# Patient Record
Sex: FEMALE | Race: BLACK OR AFRICAN AMERICAN
[De-identification: names, ages, dates, MRNs, and addresses within clinical notes are randomized per-mention and may not be internally consistent; named-entity substitution may affect disease eponyms.]

---

## 2018-02-17 ENCOUNTER — HOSPITAL ENCOUNTER (EMERGENCY)
Dept: HOSPITAL 72 - EMR | Age: 43
Discharge: LEFT BEFORE DISCHARGE | End: 2018-02-17
Payer: MEDICAID

## 2018-02-17 VITALS — WEIGHT: 270 LBS | BODY MASS INDEX: 49.69 KG/M2 | HEIGHT: 62 IN

## 2018-02-17 VITALS — DIASTOLIC BLOOD PRESSURE: 88 MMHG | SYSTOLIC BLOOD PRESSURE: 148 MMHG

## 2018-02-17 VITALS — SYSTOLIC BLOOD PRESSURE: 154 MMHG | DIASTOLIC BLOOD PRESSURE: 98 MMHG

## 2018-02-17 DIAGNOSIS — R60.9: ICD-10-CM

## 2018-02-17 PROCEDURE — 99281 EMR DPT VST MAYX REQ PHY/QHP: CPT

## 2018-02-17 NOTE — EMERGENCY ROOM REPORT
History of Present Illness


General


Chief Complaint:  Edema


Source:  Patient





Present Illness


HPI


This is a 42-year-old female who has a history of postpartum car myopathy in 

the past.  She has had a spontaneous vaginal delivery at [facility] a week ago.  

She had some fluid retention from her pregnancy.  Blood pressure was high also.

  She is currently taking labetalol and Lasix 20 mg every other day.  She was 

just released a week ago.  Said she had an echocardiogram of her heart and was 

told was fine and was discharged after 3 days in the hospital.  She went back 

to [facility] 2 days ago because of fluid retention.  They then placed on Lasix 20 

mg every other day.  Blood work was fine.  Chest x-ray was normal per patient.  

She came back because the Lasix is not working.  She still have fluid 

retention.  Worse when she walks.  No fever chills but no shortness of breath.  

Denies any other complaint.


Allergies:  


Coded Allergies:  


     No Known Allergies (Unverified , 7/5/15)





Patient History


Past Medical History:  see triage record, old chart reviewed, HTN


Past Surgical History:  other


Pertinent Family History:  none


Social History:  Denies: smoking


Last Menstrual Period:  Delivered baby 1 week ago


Pregnant Now:  No


Immunizations:  other


Reviewed Nursing Documentation:  PMH: Agreed, PSxH: Agreed





Nursing Documentation-PMH


Past Medical History:  No History, Except For


Hx Cardiac Problems:  Yes - CHF


Hx Hypertension:  Yes


Hx COPD:  No - Bronchitis


Hx Cancer:  No


Hx Gastrointestinal Problems:  No





Review of Systems


Eye:  Denies: eye pain, blurred vision


ENT:  Denies: ear pain, nose congestion, throat swelling


Respiratory:  Denies: cough, shortness of breath


Cardiovascular:  Denies: chest pain, palpitations


Gastrointestinal:  Denies: abdominal pain, diarrhea, nausea, vomiting


Musculoskeletal:  Reports: other - Edema, Denies: back pain, joint pain


Skin:  Denies: rash


Neurological:  Denies: headache, numbness


Endocrine:  Denies: increased thirst, increased urine


Hematologic/Lymphatic:  Denies: easy bruising


All Other Systems:  negative except mentioned in HPI





Physical Exam





Vital Signs








  Date Time  Temp Pulse Resp B/P (MAP) Pulse Ox O2 Delivery O2 Flow Rate FiO2


 


2/17/18 21:02 98.7 98 17 151/98 97 Room Air  





 98.8       





vitals with high blood pressure


Sp02 EP Interpretation:  reviewed, normal


General Appearance:  well appearing, no apparent distress, alert, obese


Head:  normocephalic, atraumatic


Eyes:  bilateral eye PERRL, bilateral eye EOMI


ENT:  hearing grossly normal, normal pharynx


Neck:  full range of motion, supple, no meningismus


Respiratory:  chest non-tender, lungs clear, normal breath sounds


Cardiovascular #1:  regular rate, rhythm, no murmur


Gastrointestinal:  normal bowel sounds, non tender, no mass, no organomegaly, 

no bruit, non-distended


Musculoskeletal:  back normal, gait/station normal, normal range of motion, 

swelling - 2+ edema


Psychiatric:  mood/affect normal


Skin:  warm/dry





Medical Decision Making


Diagnostic Impression:  


 Primary Impression:  


 Peripheral edema


ER Course


Patient presents with peripheral edema.  Clinically no evidence of CHF.  I will 

order blood work and IV Lasix.  Patient eloped without staff now.  We checked 

the waiting room and outside and there's nobody care.  No one in the bathroom.  

Patient again eloped.





Last Vital Signs








  Date Time  Temp Pulse Resp B/P (MAP) Pulse Ox O2 Delivery O2 Flow Rate FiO2


 


2/17/18 21:02 98.7 98 17 151/98 97 Room Air  





 98.8       








Status:  unchanged


Disposition:  ELOPED


Condition:  Stable











TANNER MORAN M.D. Feb 17, 2018 21:23

## 2018-02-19 ENCOUNTER — HOSPITAL ENCOUNTER (INPATIENT)
Dept: HOSPITAL 72 - EMR | Age: 43
LOS: 1 days | Discharge: HOME | DRG: 566 | End: 2018-02-20
Payer: MEDICAID

## 2018-02-19 VITALS — SYSTOLIC BLOOD PRESSURE: 147 MMHG | DIASTOLIC BLOOD PRESSURE: 104 MMHG

## 2018-02-19 VITALS — DIASTOLIC BLOOD PRESSURE: 99 MMHG | SYSTOLIC BLOOD PRESSURE: 137 MMHG

## 2018-02-19 VITALS — SYSTOLIC BLOOD PRESSURE: 108 MMHG | DIASTOLIC BLOOD PRESSURE: 71 MMHG

## 2018-02-19 VITALS — WEIGHT: 258.38 LBS | HEIGHT: 62 IN | BODY MASS INDEX: 47.55 KG/M2

## 2018-02-19 VITALS — SYSTOLIC BLOOD PRESSURE: 139 MMHG | DIASTOLIC BLOOD PRESSURE: 85 MMHG

## 2018-02-19 VITALS — DIASTOLIC BLOOD PRESSURE: 92 MMHG | SYSTOLIC BLOOD PRESSURE: 132 MMHG

## 2018-02-19 VITALS — DIASTOLIC BLOOD PRESSURE: 90 MMHG | SYSTOLIC BLOOD PRESSURE: 150 MMHG

## 2018-02-19 VITALS — DIASTOLIC BLOOD PRESSURE: 89 MMHG | SYSTOLIC BLOOD PRESSURE: 130 MMHG

## 2018-02-19 VITALS — DIASTOLIC BLOOD PRESSURE: 98 MMHG | SYSTOLIC BLOOD PRESSURE: 160 MMHG

## 2018-02-19 VITALS — SYSTOLIC BLOOD PRESSURE: 145 MMHG | DIASTOLIC BLOOD PRESSURE: 101 MMHG

## 2018-02-19 VITALS — DIASTOLIC BLOOD PRESSURE: 113 MMHG | SYSTOLIC BLOOD PRESSURE: 148 MMHG

## 2018-02-19 DIAGNOSIS — R31.9: ICD-10-CM

## 2018-02-19 DIAGNOSIS — O99.89: Primary | ICD-10-CM

## 2018-02-19 DIAGNOSIS — I50.21: ICD-10-CM

## 2018-02-19 DIAGNOSIS — J20.9: ICD-10-CM

## 2018-02-19 DIAGNOSIS — N39.0: ICD-10-CM

## 2018-02-19 DIAGNOSIS — E66.9: ICD-10-CM

## 2018-02-19 LAB
ADD MANUAL DIFF: NO
ALBUMIN SERPL-MCNC: 1.8 G/DL (ref 3.4–5)
ALBUMIN SERPL-MCNC: 1.9 G/DL (ref 3.4–5)
ALBUMIN/GLOB SERPL: 0.4 {RATIO} (ref 1–2.7)
ALP SERPL-CCNC: 104 U/L (ref 46–116)
ALP SERPL-CCNC: 113 U/L (ref 46–116)
ALT SERPL-CCNC: 117 U/L (ref 12–78)
ALT SERPL-CCNC: 125 U/L (ref 12–78)
ANION GAP SERPL CALC-SCNC: 6 MMOL/L (ref 5–15)
APPEARANCE UR: (no result)
APTT PPP: YELLOW S
AST SERPL-CCNC: 73 U/L (ref 15–37)
AST SERPL-CCNC: 86 U/L (ref 15–37)
BASOPHILS NFR BLD AUTO: 0.5 % (ref 0–2)
BILIRUB DIRECT SERPL-MCNC: < 0.1 MG/DL (ref 0–0.3)
BILIRUB SERPL-MCNC: 0.2 MG/DL (ref 0.2–1)
BILIRUB SERPL-MCNC: 0.2 MG/DL (ref 0.2–1)
BUN SERPL-MCNC: 17 MG/DL (ref 7–18)
CALCIUM SERPL-MCNC: 7.8 MG/DL (ref 8.5–10.1)
CHLORIDE SERPL-SCNC: 106 MMOL/L (ref 98–107)
CO2 SERPL-SCNC: 27 MMOL/L (ref 21–32)
CREAT SERPL-MCNC: 1 MG/DL (ref 0.55–1.3)
EOSINOPHIL NFR BLD AUTO: 0.6 % (ref 0–3)
ERYTHROCYTE [DISTWIDTH] IN BLOOD BY AUTOMATED COUNT: 16.1 % (ref 11.6–14.8)
GLOBULIN SER-MCNC: 4.4 G/DL
GLUCOSE UR STRIP-MCNC: NEGATIVE MG/DL
HCT VFR BLD CALC: 38.6 % (ref 37–47)
HGB BLD-MCNC: 12.2 G/DL (ref 12–16)
KETONES UR QL STRIP: NEGATIVE
LEUKOCYTE ESTERASE UR QL STRIP: (no result)
LYMPHOCYTES NFR BLD AUTO: 33.3 % (ref 20–45)
MCV RBC AUTO: 90 FL (ref 80–99)
MONOCYTES NFR BLD AUTO: 5.9 % (ref 1–10)
NEUTROPHILS NFR BLD AUTO: 59.8 % (ref 45–75)
NITRITE UR QL STRIP: POSITIVE
PH UR STRIP: 6.5 [PH] (ref 4.5–8)
PLATELET # BLD: 208 K/UL (ref 150–450)
POTASSIUM SERPL-SCNC: 3.5 MMOL/L (ref 3.5–5.1)
PROT UR QL STRIP: (no result)
RBC # BLD AUTO: 4.26 M/UL (ref 4.2–5.4)
SODIUM SERPL-SCNC: 139 MMOL/L (ref 136–145)
SP GR UR STRIP: 1.01 (ref 1–1.03)
UROBILINOGEN UR-MCNC: NORMAL MG/DL (ref 0–1)
WBC # BLD AUTO: 5.1 K/UL (ref 4.8–10.8)

## 2018-02-19 PROCEDURE — 80061 LIPID PANEL: CPT

## 2018-02-19 PROCEDURE — 93005 ELECTROCARDIOGRAM TRACING: CPT

## 2018-02-19 PROCEDURE — 80076 HEPATIC FUNCTION PANEL: CPT

## 2018-02-19 PROCEDURE — 84443 ASSAY THYROID STIM HORMONE: CPT

## 2018-02-19 PROCEDURE — 36415 COLL VENOUS BLD VENIPUNCTURE: CPT

## 2018-02-19 PROCEDURE — 81003 URINALYSIS AUTO W/O SCOPE: CPT

## 2018-02-19 PROCEDURE — 99285 EMERGENCY DEPT VISIT HI MDM: CPT

## 2018-02-19 PROCEDURE — 87181 SC STD AGAR DILUTION PER AGT: CPT

## 2018-02-19 PROCEDURE — 83690 ASSAY OF LIPASE: CPT

## 2018-02-19 PROCEDURE — 94640 AIRWAY INHALATION TREATMENT: CPT

## 2018-02-19 PROCEDURE — 83880 ASSAY OF NATRIURETIC PEPTIDE: CPT

## 2018-02-19 PROCEDURE — 85025 COMPLETE CBC W/AUTO DIFF WBC: CPT

## 2018-02-19 PROCEDURE — 80053 COMPREHEN METABOLIC PANEL: CPT

## 2018-02-19 PROCEDURE — 93306 TTE W/DOPPLER COMPLETE: CPT

## 2018-02-19 PROCEDURE — 87086 URINE CULTURE/COLONY COUNT: CPT

## 2018-02-19 PROCEDURE — 83735 ASSAY OF MAGNESIUM: CPT

## 2018-02-19 PROCEDURE — 83036 HEMOGLOBIN GLYCOSYLATED A1C: CPT

## 2018-02-19 PROCEDURE — 72193 CT PELVIS W/DYE: CPT

## 2018-02-19 PROCEDURE — 84484 ASSAY OF TROPONIN QUANT: CPT

## 2018-02-19 PROCEDURE — 87040 BLOOD CULTURE FOR BACTERIA: CPT

## 2018-02-19 RX ADMIN — SODIUM CHLORIDE SCH MLS/HR: 0.9 INJECTION INTRAVENOUS at 11:35

## 2018-02-19 RX ADMIN — LABETALOL HCL SCH MG: 200 TABLET, FILM COATED ORAL at 13:10

## 2018-02-19 RX ADMIN — LABETALOL HCL SCH MG: 200 TABLET, FILM COATED ORAL at 17:27

## 2018-02-19 RX ADMIN — AZITHROMYCIN DIHYDRATE SCH MLS/HR: 500 INJECTION, POWDER, LYOPHILIZED, FOR SOLUTION INTRAVENOUS at 16:19

## 2018-02-19 RX ADMIN — MONTELUKAST SODIUM SCH MG: 10 TABLET, COATED ORAL at 13:04

## 2018-02-19 NOTE — HISTORY AND PHYSICAL
History of Present Illness


General


Date patient seen:  Feb 19, 2018


Time patient seen:  10:00


Reason for Hospitalization:  CHF, HTN





Present Illness


HPI


43y/o female with pmh of HTN, pre-eclampsia, recent pregnancy abt 10 days ago 

at [place] who presents with SOB, BLE swelling, palpitations. Pt states she was 

diagnosed w/ preeclampsia prior to her deliver. She said after delivery she had 

worsening of BPs, leg swelling, SOB. She was diagnosed w/ CHF. She was recently 

at outside hospital and was treated with diuretics and discharged. Pt cont to c/

o worsening b/l leg swelling, SOB, palpitations. Denies chest pain, f/c, n/v, d/

c, abd pain, dysuria. She has been on lasix every other day but feels it is not 

working. Denies PND, orthopnea.


Allergies:  


Coded Allergies:  


     No Known Allergies (Unverified , 3/28/15)





Medication History


Scheduled


Azithromycin* (Zithromax*), 250 MG ORAL DAILY


Cephalexin* (Keflex*), 500 MG ORAL EVERY 6 HOURS


Ferrous Sulfate* (Ferrous Sulfate*), 325 MG PO DAILY, (Reported)


Furosemide* (Lasix*), 20 MG ORAL DAILY


Hydralazine Hcl* (Hydralazine Hcl*), 25 MG ORAL Q6HR


Montelukast Sodium* (Singulair*), 10 MG ORAL DAILY


Montelukast Sodium* (Singulair*), Unknown Dose ORAL DAILY, (Reported)


Prenatal Vit/Iron Fumarate/Fa (Prenatal Vitamins Tablet), 1 TAB PO DAILY, (

Reported)





Scheduled PRN


Albuterol Sulfate* (Albuterol Sulfate Mdi*), 2 PUFF INH Q4H PRN for Shortness 

of Breath


Albuterol Sulfate* (Albuterol Sulfate Mdi*), 2 PUFF INH Q6H PRN for Shortness 

of Breath, (Reported)


Hydrocodone Bit/Acetaminophen 5-325* (Norco 5-325*), 1 TAB ORAL Q6H PRN for For 

Pain





Miscellaneous Medications


Albuterol Sulfate* (Proair Hfa*), (Reported)


Docusate Sodium (Stool Softener), Unknown Dose PO, (Reported)


Ibuprofen (Ibuprofen), Unknown Dose PO, (Reported)





Patient History


History Provided By:  Patient, Medical Record


Healthcare decision maker





Resuscitation status


Full Code


Advanced Directive on File








Past Medical/Surgical History


Past Medical/Surgical History:  


(1) Preeclampsia


(2) HTN (hypertension)





Family History


Family History:  


Patient reports no known family medical history.





Social History


Social History:  


(1) No significant social history





Review of Systems


Constitutional:  Reports: no symptoms


Eye:  Reports: no symptoms


ENT:  Reports: no symptoms


Respiratory:  Reports: cough, shortness of breath


Cardiovascular:  Reports: palpitations


Gastrointestinal:  Reports: no symptoms


Genitourinary:  Reports: no symptoms


Musculoskeletal:  Reports: no symptoms


Skin:  Reports: no symptoms


Psychiatric:  Reports: no symptoms


Neurological:  Reports: no symptoms


Endocrine:  Reports: no symptoms


Hematologic/Lymphatic:  Reports: no symptoms





Physical Exam


Physical Exam Narrative


General: alert, cooperative, no distress, appears stated age, obese


Head: normocephalic, without obvious abnormality, atraumatic


Eyes: conjunctivae/corneas clear. PERRL, EOM's intact


Throat: lips, mucosa, and tongue normal. MMM


Neck: supple, symmetrical, trachea midline, and +JVD


Lungs: mild bibasilar crackles


Heart: regular rate and rhythm, S1, S2 normal, no murmur, click, rub or gallop


Abdomen: soft, non-tender, non-distended, bowel sounds normal;


Extremities: extremities normal, atraumatic, no cyanosis, 1-2+ pitting edema to 

BLE knees


Pulses: 2+ and symmetric


Skin: skin color, texture, turgor normal; no rashes or lesions


Neurologic: grossly normal, no focal deficits





Last 24 Hour Vital Signs








  Date Time  Temp Pulse Resp B/P (MAP) Pulse Ox O2 Delivery O2 Flow Rate FiO2


 


2/19/18 20:00 97.6 100 19 132/92 94 Room Air  


 


2/19/18 20:00  94      


 


2/19/18 17:27  84  108/71    


 


2/19/18 16:00  86      


 


2/19/18 16:00 98.2 84 18 108/71 99 Room Air  


 


2/19/18 13:10  96  147/104    


 


2/19/18 12:00 96.4 93 18 147/104 99 Room Air  


 


2/19/18 12:00  96      


 


2/19/18 09:08  94      


 


2/19/18 08:45 97.7 88 20 145/101 99 Room Air  


 


2/19/18 08:40 98.3 86 20 139/85 100 Room Air  





 98.3       


 


2/19/18 07:30 98.3 86 20 139/85 100 Room Air  





 98.3       


 


2/19/18 06:20 98.2 91 14 148/113 100   





 98.2       


 


2/19/18 05:25 98.1 95 14 137/99 99   





 98.1       


 


2/19/18 04:25 98.4 98 16 130/89 100   





 98.4       


 


2/19/18 03:41  88  155/97    


 


2/19/18 03:30 98.6 97 16 160/98 99   





 98.6       


 


2/19/18 02:35 98.4 102 18 150/90 99   





 98.4       


 


2/19/18 02:05 98.5 107 18 150/90 98   





 98.4       

















Intake and Output  


 


 2/18/18 2/19/18





 19:00 07:00


 


Intake Total  110 ml


 


Balance  110 ml


 


  


 


IV Total  110 ml


 


# Voids  3


 


# Bowel Movements  2











Laboratory Tests








Test


  2/19/18


02:35 2/19/18


12:20


 


White Blood Count


  5.1 K/UL


(4.8-10.8) 


 


 


Red Blood Count


  4.26 M/UL


(4.20-5.40) 


 


 


Hemoglobin


  12.2 G/DL


(12.0-16.0) 


 


 


Hematocrit


  38.6 %


(37.0-47.0) 


 


 


Mean Corpuscular Volume 90 FL (80-99)   


 


Mean Corpuscular Hemoglobin


  28.7 PG


(27.0-31.0) 


 


 


Mean Corpuscular Hemoglobin


Concent 31.7 G/DL


(32.0-36.0)  L 


 


 


Red Cell Distribution Width


  16.1 %


(11.6-14.8)  H 


 


 


Platelet Count


  208 K/UL


(150-450) 


 


 


Mean Platelet Volume


  7.3 FL


(6.5-10.1) 


 


 


Neutrophils (%) (Auto)


  59.8 %


(45.0-75.0) 


 


 


Lymphocytes (%) (Auto)


  33.3 %


(20.0-45.0) 


 


 


Monocytes (%) (Auto)


  5.9 %


(1.0-10.0) 


 


 


Eosinophils (%) (Auto)


  0.6 %


(0.0-3.0) 


 


 


Basophils (%) (Auto)


  0.5 %


(0.0-2.0) 


 


 


Urine Color Yellow   


 


Urine Appearance Cloudy   


 


Urine pH 6.5 (4.5-8.0)   


 


Urine Specific Gravity


  1.015


(1.005-1.035) 


 


 


Urine Protein


  4+ (NEGATIVE)


H 


 


 


Urine Glucose (UA)


  Negative


(NEGATIVE) 


 


 


Urine Ketones


  Negative


(NEGATIVE) 


 


 


Urine Occult Blood


  5+ (NEGATIVE)


H 


 


 


Urine Nitrite


  Positive


(NEGATIVE)  H 


 


 


Urine Bilirubin


  Negative


(NEGATIVE) 


 


 


Urine Urobilinogen


  Normal MG/DL


(0.0-1.0) 


 


 


Urine Leukocyte Esterase


  2+ (NEGATIVE)


H 


 


 


Urine RBC


  Tntc /HPF (0 -


2)  H 


 


 


Urine WBC


  60-80 /HPF (0


- 2)  H 


 


 


Urine Squamous Epithelial


Cells Few /LPF


(NONE/OCC) 


 


 


Urine Bacteria


  Many /HPF


(NONE)  H 


 


 


Sodium Level


  139 MMOL/L


(136-145) 


 


 


Potassium Level


  3.5 MMOL/L


(3.5-5.1) 


 


 


Chloride Level


  106 MMOL/L


() 


 


 


Carbon Dioxide Level


  27 MMOL/L


(21-32) 


 


 


Anion Gap


  6 mmol/L


(5-15) 


 


 


Blood Urea Nitrogen


  17 mg/dL


(7-18) 


 


 


Creatinine


  1.0 MG/DL


(0.55-1.30) 


 


 


Estimat Glomerular Filtration


Rate > 60 mL/min


(>60) 


 


 


Glucose Level


  132 MG/DL


()  H 


 


 


Calcium Level


  7.8 MG/DL


(8.5-10.1)  L 


 


 


Total Bilirubin


  0.2 MG/DL


(0.2-1.0) 0.2 MG/DL


(0.2-1.0)


 


Aspartate Amino Transf


(AST/SGOT) 86 U/L (15-37)


H 73 U/L (15-37)


H


 


Alanine Aminotransferase


(ALT/SGPT) 125 U/L


(12-78)  H 117 U/L


(12-78)  H


 


Alkaline Phosphatase


  113 U/L


() 104 U/L


()


 


Troponin I


  0.012 ng/mL


(0.000-0.056) 0.008 ng/mL


(0.000-0.056)


 


Total Protein


  6.3 G/DL


(6.4-8.2)  L 6.1 G/DL


(6.4-8.2)  L


 


Albumin


  1.9 G/DL


(3.4-5.0)  L 1.8 G/DL


(3.4-5.0)  L


 


Globulin 4.4 g/dL   


 


Albumin/Globulin Ratio


  0.4 (1.0-2.7)


L 


 


 


Lipase


  211 U/L


() 


 


 


Direct Bilirubin


  


  < 0.1 MG/DL


(0.0-0.3)


 


Pro-B-Type Natriuretic Peptide


  


  1331 pg/mL


(0-125)  H


 


Thyroid Stimulating Hormone


(TSH) 


  0.890 uiU/mL


(0.358-3.740)








Height (Feet):  5


Height (Inches):  2.00


Weight (Pounds):  270


Medications





Current Medications








 Medications


  (Trade)  Dose


 Ordered  Sig/Sylvie


 Route


 PRN Reason  Start Time


 Stop Time Status Last Admin


Dose Admin


 


 Acetaminophen


  (Tylenol)  650 mg  Q4H  PRN


 ORAL


 Mild Pain (Pain Scale 1-3)  2/19/18 05:30


 3/21/18 05:29  2/19/18 11:55


 


 


 Acetaminophen


  (Tylenol)  650 mg  Q4H  PRN


 ORAL


 fever  2/19/18 05:30


 3/21/18 05:29   


 


 


 Acetaminophen


  (Tylenol)  650 mg  Q4H  PRN


 RECTAL


 Mild Pain (Pain Scale 1-3)  2/19/18 05:30


 3/21/18 05:29   


 


 


 Acetaminophen


  (Tylenol)  650 mg  Q4H  PRN


 RECTAL


 fever  2/19/18 05:30


 3/21/18 05:29   


 


 


 Azithromycin 500


 mg/Dextrose  275 ml @ 


 275 mls/hr  Q24HRS


 IV


   2/19/18 13:00


 2/25/18 13:59  2/19/18 16:19


 


 


 Bisacodyl


  (Dulcolax)  10 mg  DAILYPRN  PRN


 RECTAL


 Constipation  2/19/18 05:30


 3/21/18 05:29   


 


 


 Ceftriaxone


 Sodium 1 gm/


 Sodium Chloride  55 ml @ 


 110 mls/hr  Q24H


 IVPB


   2/19/18 06:00


 2/26/18 05:59  2/19/18 11:35


 


 


 Dextrose


  (Dextrose 50%)    STAT  PRN


 IV


 Hypoglycemia  2/19/18 05:30


 3/21/18 05:29   


 


 


 Diphenhydramine


 HCl


  (Benadryl)  25 mg  Q6H  PRN


 ORAL


 Itching/Pruritis  2/19/18 05:30


 3/21/18 05:29   


 


 


 Ferrous Sulfate


  (Feosol)  325 mg  DAILY


 ORAL


   2/19/18 12:15


 3/21/18 12:14  2/19/18 13:10


 


 


 Furosemide


  (Lasix)  20 mg  ONCE  ONCE


 IV


   2/20/18 08:00


 2/20/18 08:01   


 


 


 Labetalol HCl


  (Normodyne)  100 mg  Q12H  PRN


 ORAL


 sbp/dbp greater than 150/90  2/19/18 19:30


 3/21/18 19:29   


 


 


 Labetalol HCl


  (Normodyne)  200 mg  BID


 ORAL


   2/19/18 12:15


 3/21/18 12:14  2/19/18 13:10


 


 


 Montelukast Sodium


  (Singulair)  10 mg  DAILY


 ORAL


   2/19/18 12:15


 3/21/18 12:14  2/19/18 13:04


 


 


 Polyethylene


 Glycol


  (Miralax)  17 gm  DAILYPRN  PRN


 ORAL


 Constipation  2/19/18 05:30


 3/21/18 05:29   


 


 


 Zolpidem Tartrate


  (Ambien)  5 mg  DAILYPRN  PRN


 ORAL


 Insomnia  2/19/18 05:30


 2/26/18 05:29   


 











Assessment/Plan


Problem List:  


(1) Postpartum cardiomyopathy


ICD Codes:  O90.3 - Peripartum cardiomyopathy


SNOMED:  23727877


(2) Acute systolic (congestive) heart failure


ICD Codes:  I50.21 - Acute systolic (congestive) heart failure


SNOMED:  37296979, 362900556


(3) HTN (hypertension)


ICD Codes:  I10 - Essential (primary) hypertension


SNOMED:  06166714


Status:  stable


Assessment/Plan


Admit to tele


Trend trop/EKG


Check TTE


Cardiology consulted


BP control w/ labetolol


Pt is lactating female so caution meds


Empiric ceftriaxone for UTI


F/u urine culture


Strict I/O's, daily weights


Monitor lytes closely


CHF education





FULL CODE





DVT ppx: SCDs, HSQ





D/w pt, RN, SW/, Cardiology regarding mgmt and dispo











Keven Haji M.D. Feb 19, 2018 23:18

## 2018-02-19 NOTE — CONSULTATION
DATE OF CONSULTATION:  02/19/2018



INFECTIOUS DISEASES CONSULTATION



CONSULTING PHYSICIAN:  Elijah Colvin M.D.



REFERRING PHYSICIANS:

1. Harlan Cason M.D.

2. Kevne Haji M.D.



REASON FOR CONSULTATION:  Evaluation of UTI, possible pneumonia, antibiotic

management.



HISTORY OF PRESENT ILLNESS:  The patient is a 42-year-old female, who had a

vaginal delivery on 02/09/2018.  The patient was admitted here with the

chief complaint of lower abdominal pain, post pregnancy preeclampsia.

Infectious Diseases consultation has been requested for evaluation of the

patient for cough and dysuria.



PAST MEDICAL HISTORY:

1. Hypertension.

2. Asthma.

3. Obesity.

4. Fibromyalgia.

5. Depression/anxiety.

6. ? cardiomyopathy.



MEDICATIONS:  Rocephin.



ALLERGIES:  No known drug allergies.



SOCIAL HISTORY:  The patient lives with the family.



FAMILY HISTORY:  Not contributing.



REVIEW OF SYSTEMS:  HEENT:  No recent change in vision or hearing.

PULMONARY:  Cough with sputum production (_____).  CARDIOVASCULAR:  No

chest pain.    ABDOMEN:  The patient has left lower pelvic pain.

MUSCULOSKELETAL:  Tenderness, also has pain.  GENITOURINARY:  As mentioned

above.



PHYSICAL EXAMINATION:

VITAL SIGNS:  Temperature 98 degrees, pulse 86, respiratory rate 18, and

blood pressure 144/101.

HEENT:  No pale conjunctivae.  No icterus.

NECK:  No lymphadenopathy.

CHEST:  Clear.

HEART:  S1 and S2.

ABDOMEN:  Soft, obese, mild tenderness in the suprapubic area towards the

left.

MUSCULOSKELETAL:  No pain in extremity.

NEUROLOGIC:  Awake and alert.



LABORATORY AND DIAGNOSTIC DATA:  White blood cells 5.1, hemoglobin 12, and

platelets 208.  UA shows too numerous to count red blood cells, 60-80

white blood cells.  BUN 17 and creatinine 1.  AST 86, , and

alkaline phosphatase 113.  Urine culture is pending.



ASSESSMENT:  The patient is a 42-year-old female with;



1. Community-acquired pneumonia/bronchitis.  The patient has sputum

production and cough, rule out flu.

2. Dysuria, possible urinary tract infection, history of Daniel catheter

placement.

3. Hematuria probably due to vaginal bleed.

4. Increase in liver function tests due to preeclampsia.

5. Left pelvic pain.  We will order a CT of the pelvis for further

evaluation and rule out possibility of ovarian abscess.

6. Daniel catheter placement.

7. Hematuria due to vaginal bleed.

8. Abnormal liver function tests due to preeclampsia.

9. Left pelvic pain, rule out abscess.



PLAN:

1. We will continue the patient on Rocephin for urinary tract infection,

add Zithromax for bronchitis.

2. Flu screening.

3. Blood culture and urine culture.

4. CT of pelvis with contrast.

5. Based on the patient's clinical course and laboratories, we will do

further recommendations.



Thank you for this consultation.  I will follow the patient with you

during the hospitalization.









  ______________________________________________

  Elijah Colvin M.D.





DR:  ANNE-MARIE

D:  02/19/2018 12:17

T:  02/19/2018 22:55

JOB#:  [id number]

CC:

## 2018-02-19 NOTE — EMERGENCY ROOM REPORT
History of Present Illness


General


Chief Complaint:  General Complaint


Source:  Patient





Present Illness


HPI


42YOF walked in with chief complaint of irregular HR, worse when she lies on 

left side.


Associated with leg swelling as well.


Denies chest pain, SOB, palpitations


Just "feels funny."


States she had delivery 1 week ago and was told to take lasix every other day 

for ?postpartum cardiomyopathy. She took lasix yesterday but states "its not 

working because Im not peeing enough" but also endorses "my mouth is dry."


Patient was here last night but eloped before workup could be done


Per Dr Cortes's note, she had had normal blood work and CXR at [place] 2 days 

ago.


Allergies:  


Coded Allergies:  


     No Known Allergies (Unverified , 7/5/15)





Patient History


Past Medical History:  other - see HPI


Past Surgical History:  none


Pertinent Family History:  none


Social History:  Denies: smoking, alcohol use, drug use


Last Menstrual Period:  recently gave birth


Pregnant Now:  No


Immunizations:  UTD


Reviewed Nursing Documentation:  PMH: Agreed, PSxH: Agreed





Nursing Documentation-PMH


Hx Cardiac Problems:  Yes - CHF


Hx Hypertension:  Yes


Hx COPD:  No - Bronchitis





Review of Systems


All Other Systems:  negative except mentioned in HPI





Physical Exam





Vital Signs








  Date Time  Temp Pulse Resp B/P (MAP) Pulse Ox O2 Delivery O2 Flow Rate FiO2


 


2/19/18 02:05 98.5 107 18 150/90 9   





 98.4       








Sp02 EP Interpretation:  reviewed, normal


General Appearance:  normal inspection, well appearing, no apparent distress, 

alert, GCS 15, non-toxic, obese


Head:  normocephalic, atraumatic


Eyes:  bilateral eye PERRL, bilateral eye EOMI


ENT:  normal ENT inspection, hearing grossly normal, normal pharynx, no 

angioedema, normal voice, TMs + canals normal, uvula midline, moist mucus 

membranes


Neck:  normal inspection, full range of motion, supple, thyroid normal, no 

meningismus, no bony tend


Respiratory:  normal inspection, lungs clear, normal breath sounds, no rhonchi, 

no respiratory distress, no retraction, no accessory muscle use, no wheezing, 

speaking full sentences


Cardiovascular #1:  regular rate, rhythm, no edema, no JVD, normal capillary 

refill


Gastrointestinal:  normal inspection, normal bowel sounds, non tender, soft, no 

mass, no peritonitis, non-distended, no guarding, no hernia, no pulsatile mass


Genitourinary:  no CVA tenderness


Musculoskeletal:  normal inspection, back normal, normal range of motion, no 

calf tenderness, pelvis stable, Real's Sign negative, other - +1 pitting edema 

L>R


Neurologic:  normal inspection, alert, oriented x3, responsive, CNs III-XII nml 

as tested, motor strength/tone normal, cerebellar normal, normal gait, speech 

normal


Psychiatric:  normal inspection, judgement/insight normal, mood/affect normal, 

no suicidal/homicidal ideation, no delusions


Skin:  normal inspection, normal color, no rash


Lymphatic:  normal inspection, no adenopathy





Medical Decision Making


Diagnostic Impression:  


 Primary Impression:  


 Peripheral edema


 Additional Impressions:  


 Pre-eclampsia, postpartum


 UTI (urinary tract infection)


 Qualified Codes:  N30.01 - Acute cystitis with hematuria


ER Course


VSS with SBP>150 and DBP>90


4+ protein, elevated LFTs, AST/ALT 86/125


per uptodate, meets criteria for postpartum pre-eclampsia


Was given labetolol 20mg with improvement in BP to 130/89


No seizures, no AMS or headache


Tele admit to Dr Cason as per insurance


Dr Ceci aggarwal, endorsed to Dr Baca at 428am for tele admit


Abs given for UTI


EKG Diagnostic Results


Rate:  normal


Rhythm:  NSR


ST Segments:  no acute changes


ASA given to the pt in ED:  No





Rhythm Strip Diag. Results


EP Interpretation:  yes


Rate:  90


Rhythm:  NSR, no PVC's, no ectopy





Last Vital Signs








  Date Time  Temp Pulse Resp B/P (MAP) Pulse Ox O2 Delivery O2 Flow Rate FiO2


 


2/19/18 02:05 98.5 107 18 150/90 9   





 98.4       








Status:  improved


Disposition:  ADMITTED AS INPATIENT


Condition:  Serious











ABBY PIERCE M.D. Feb 19, 2018 02:27

## 2018-02-19 NOTE — CARDIOLOGY PROGRESS NOTE
Assessment/Plan


Assessment/Plan


[id number]





agree with diuretic and labatolol for now 


need to exclude post partum cm may need hydralazine 





avoid nsaids 


keep in mind she will b breast feeding 


abn lf noted ut bili and plt are normal not meet criterial for hellp





Objective





Last 24 Hour Vital Signs








  Date Time  Temp Pulse Resp B/P (MAP) Pulse Ox O2 Delivery O2 Flow Rate FiO2


 


2/19/18 17:27  84  108/71    


 


2/19/18 16:00  86      


 


2/19/18 16:00 98.2 84 18 108/71 99 Room Air  


 


2/19/18 13:10  96  147/104    


 


2/19/18 12:00 96.4 93 18 147/104 99 Room Air  


 


2/19/18 12:00  96      


 


2/19/18 09:08  94      


 


2/19/18 08:45 97.7 88 20 145/101 99 Room Air  


 


2/19/18 08:40 98.3 86 20 139/85 100 Room Air  





 98.3       


 


2/19/18 07:30 98.3 86 20 139/85 100 Room Air  





 98.3       


 


2/19/18 06:20 98.2 91 14 148/113 100   





 98.2       


 


2/19/18 05:25 98.1 95 14 137/99 99   





 98.1       


 


2/19/18 04:25 98.4 98 16 130/89 100   





 98.4       


 


2/19/18 03:41  88  155/97    


 


2/19/18 03:30 98.6 97 16 160/98 99   





 98.6       


 


2/19/18 02:35 98.4 102 18 150/90 99   





 98.4       


 


2/19/18 02:05 98.5 107 18 150/90 98   





 98.4       

















Intake and Output  


 


 2/18/18 2/19/18





 19:00 07:00


 


Intake Total  110 ml


 


Balance  110 ml


 


  


 


IV Total  110 ml


 


# Voids  3


 


# Bowel Movements  2











Laboratory Tests








Test


  2/19/18


02:35 2/19/18


12:20


 


White Blood Count


  5.1 K/UL


(4.8-10.8) 


 


 


Red Blood Count


  4.26 M/UL


(4.20-5.40) 


 


 


Hemoglobin


  12.2 G/DL


(12.0-16.0) 


 


 


Hematocrit


  38.6 %


(37.0-47.0) 


 


 


Mean Corpuscular Volume 90 FL (80-99)   


 


Mean Corpuscular Hemoglobin


  28.7 PG


(27.0-31.0) 


 


 


Mean Corpuscular Hemoglobin


Concent 31.7 G/DL


(32.0-36.0)  L 


 


 


Red Cell Distribution Width


  16.1 %


(11.6-14.8)  H 


 


 


Platelet Count


  208 K/UL


(150-450) 


 


 


Mean Platelet Volume


  7.3 FL


(6.5-10.1) 


 


 


Neutrophils (%) (Auto)


  59.8 %


(45.0-75.0) 


 


 


Lymphocytes (%) (Auto)


  33.3 %


(20.0-45.0) 


 


 


Monocytes (%) (Auto)


  5.9 %


(1.0-10.0) 


 


 


Eosinophils (%) (Auto)


  0.6 %


(0.0-3.0) 


 


 


Basophils (%) (Auto)


  0.5 %


(0.0-2.0) 


 


 


Urine Color Yellow   


 


Urine Appearance Cloudy   


 


Urine pH 6.5 (4.5-8.0)   


 


Urine Specific Gravity


  1.015


(1.005-1.035) 


 


 


Urine Protein


  4+ (NEGATIVE)


H 


 


 


Urine Glucose (UA)


  Negative


(NEGATIVE) 


 


 


Urine Ketones


  Negative


(NEGATIVE) 


 


 


Urine Occult Blood


  5+ (NEGATIVE)


H 


 


 


Urine Nitrite


  Positive


(NEGATIVE)  H 


 


 


Urine Bilirubin


  Negative


(NEGATIVE) 


 


 


Urine Urobilinogen


  Normal MG/DL


(0.0-1.0) 


 


 


Urine Leukocyte Esterase


  2+ (NEGATIVE)


H 


 


 


Urine RBC


  Tntc /HPF (0 -


2)  H 


 


 


Urine WBC


  60-80 /HPF (0


- 2)  H 


 


 


Urine Squamous Epithelial


Cells Few /LPF


(NONE/OCC) 


 


 


Urine Bacteria


  Many /HPF


(NONE)  H 


 


 


Sodium Level


  139 MMOL/L


(136-145) 


 


 


Potassium Level


  3.5 MMOL/L


(3.5-5.1) 


 


 


Chloride Level


  106 MMOL/L


() 


 


 


Carbon Dioxide Level


  27 MMOL/L


(21-32) 


 


 


Anion Gap


  6 mmol/L


(5-15) 


 


 


Blood Urea Nitrogen


  17 mg/dL


(7-18) 


 


 


Creatinine


  1.0 MG/DL


(0.55-1.30) 


 


 


Estimat Glomerular Filtration


Rate > 60 mL/min


(>60) 


 


 


Glucose Level


  132 MG/DL


()  H 


 


 


Calcium Level


  7.8 MG/DL


(8.5-10.1)  L 


 


 


Total Bilirubin


  0.2 MG/DL


(0.2-1.0) 0.2 MG/DL


(0.2-1.0)


 


Aspartate Amino Transf


(AST/SGOT) 86 U/L (15-37)


H 73 U/L (15-37)


H


 


Alanine Aminotransferase


(ALT/SGPT) 125 U/L


(12-78)  H 117 U/L


(12-78)  H


 


Alkaline Phosphatase


  113 U/L


() 104 U/L


()


 


Troponin I


  0.012 ng/mL


(0.000-0.056) 0.008 ng/mL


(0.000-0.056)


 


Total Protein


  6.3 G/DL


(6.4-8.2)  L 6.1 G/DL


(6.4-8.2)  L


 


Albumin


  1.9 G/DL


(3.4-5.0)  L 1.8 G/DL


(3.4-5.0)  L


 


Globulin 4.4 g/dL   


 


Albumin/Globulin Ratio


  0.4 (1.0-2.7)


L 


 


 


Lipase


  211 U/L


() 


 


 


Direct Bilirubin


  


  < 0.1 MG/DL


(0.0-0.3)


 


Pro-B-Type Natriuretic Peptide


  


  1331 pg/mL


(0-125)  H


 


Thyroid Stimulating Hormone


(TSH) 


  0.890 uiU/mL


(0.358-3.740)

















KARUNA NGUYEN Feb 19, 2018 19:23

## 2018-02-20 VITALS — SYSTOLIC BLOOD PRESSURE: 133 MMHG | DIASTOLIC BLOOD PRESSURE: 91 MMHG

## 2018-02-20 VITALS — SYSTOLIC BLOOD PRESSURE: 138 MMHG | DIASTOLIC BLOOD PRESSURE: 106 MMHG

## 2018-02-20 VITALS — DIASTOLIC BLOOD PRESSURE: 85 MMHG | SYSTOLIC BLOOD PRESSURE: 133 MMHG

## 2018-02-20 VITALS — DIASTOLIC BLOOD PRESSURE: 90 MMHG | SYSTOLIC BLOOD PRESSURE: 136 MMHG

## 2018-02-20 VITALS — SYSTOLIC BLOOD PRESSURE: 123 MMHG | DIASTOLIC BLOOD PRESSURE: 74 MMHG

## 2018-02-20 LAB
ADD MANUAL DIFF: NO
ALBUMIN SERPL-MCNC: 1.8 G/DL (ref 3.4–5)
ALBUMIN/GLOB SERPL: 0.4 {RATIO} (ref 1–2.7)
ALP SERPL-CCNC: 96 U/L (ref 46–116)
ALT SERPL-CCNC: 106 U/L (ref 12–78)
ANION GAP SERPL CALC-SCNC: 3 MMOL/L (ref 5–15)
AST SERPL-CCNC: 68 U/L (ref 15–37)
BASOPHILS NFR BLD AUTO: 0.6 % (ref 0–2)
BILIRUB SERPL-MCNC: 0.3 MG/DL (ref 0.2–1)
BUN SERPL-MCNC: 16 MG/DL (ref 7–18)
CALCIUM SERPL-MCNC: 7.8 MG/DL (ref 8.5–10.1)
CHLORIDE SERPL-SCNC: 109 MMOL/L (ref 98–107)
CHOLEST SERPL-MCNC: 124 MG/DL (ref ?–200)
CO2 SERPL-SCNC: 28 MMOL/L (ref 21–32)
CREAT SERPL-MCNC: 1 MG/DL (ref 0.55–1.3)
EOSINOPHIL NFR BLD AUTO: 0.5 % (ref 0–3)
ERYTHROCYTE [DISTWIDTH] IN BLOOD BY AUTOMATED COUNT: 16.6 % (ref 11.6–14.8)
GLOBULIN SER-MCNC: 4.2 G/DL
HCT VFR BLD CALC: 37.5 % (ref 37–47)
HDLC SERPL-MCNC: 36 MG/DL (ref 40–60)
HGB BLD-MCNC: 12.1 G/DL (ref 12–16)
LYMPHOCYTES NFR BLD AUTO: 27.9 % (ref 20–45)
MCV RBC AUTO: 91 FL (ref 80–99)
MONOCYTES NFR BLD AUTO: 6.5 % (ref 1–10)
NEUTROPHILS NFR BLD AUTO: 64.5 % (ref 45–75)
PLATELET # BLD: 227 K/UL (ref 150–450)
POTASSIUM SERPL-SCNC: 4 MMOL/L (ref 3.5–5.1)
RBC # BLD AUTO: 4.12 M/UL (ref 4.2–5.4)
SODIUM SERPL-SCNC: 140 MMOL/L (ref 136–145)
TRIGL SERPL-MCNC: 138 MG/DL (ref 30–150)
WBC # BLD AUTO: 5 K/UL (ref 4.8–10.8)

## 2018-02-20 RX ADMIN — AZITHROMYCIN DIHYDRATE SCH MLS/HR: 500 INJECTION, POWDER, LYOPHILIZED, FOR SOLUTION INTRAVENOUS at 14:06

## 2018-02-20 RX ADMIN — SODIUM CHLORIDE SCH MLS/HR: 0.9 INJECTION INTRAVENOUS at 07:42

## 2018-02-20 RX ADMIN — LABETALOL HCL SCH MG: 200 TABLET, FILM COATED ORAL at 09:10

## 2018-02-20 RX ADMIN — MONTELUKAST SODIUM SCH MG: 10 TABLET, COATED ORAL at 09:09

## 2018-02-20 NOTE — DIAGNOSTIC IMAGING REPORT
Indication: Pain, vaginal bleeding, recent vaginal delivery

 

Technique: Patient given oral contrast. IV administration nonionic contrast. Spiral

acquisitions obtained through the pelvis. Multiplanar reconstructions generated.

Total dose length product 1011.29 mGycm. CTDIvol(s) 29.57 mGy.  Dose reduction

achieved using automated exposure control

 

 

Comparison:

 

Findings: There is a moderate amount image noise related to patient body habitus. The

uterus is somewhat enlarged, slightly heterogeneous, consistent with postpartum

state. No focal fluid or gas collections are demonstrated. The endometrium is mildly

thickened, again no more than expected given recent delivery. No adnexal mass. No

free or loculated pelvic fluid. Visualized bowel is unremarkable. There is some edema

of the lumbar subcutaneous fat. The bladder is unremarkable.

 

Impression: Enlarged postpartum uterus with mild endometrial thickening. No unusual

features

 

No evidence of abscess, endometritis, or other pregnancy related complication

 

Incidental finding of minimal lumbar subcutaneous edema

 

 

 

The CT scanner at [facility] is accredited by the [employer] College of

[employer]iology and the scans are performed using protocols designed to limit radiation

exposure to as low as reasonably achievable to attain images of sufficient resolution

adequate for diagnostic evaluation.

## 2018-02-20 NOTE — INFECTIOUS DISEASES PROG NOTE
Assessment/Plan


Assessment/Plan








ASSESSMENT:  The patient is a 42-year-old female with;





Community-acquired pneumonia/bronchitis.  


rule out flu.


UTI 


  Dysuria


History of Daniel catheter placement.


Hematuria


   probably due to vaginal bleed


Transaminitis improving  due to preeclampsia.


Left pelvic pain ro Abscess We will order a CT of the pelvis for further


evaluation and rule out possibility of ovarian abscess.














Hypertension.


Asthma.


Obesity.


Fibromyalgia.


Depression/anxiety.


? cardiomyopathy.





PLAN:








will continue the patient on Rocephin d# 2 ( UTI ) and  Zithromax d# 2 ( 

bronchitis )


Flu screening.


Blood culture and urine culture.


CT of pelvis with contrast





Subjective


Allergies:  


Coded Allergies:  


     No Known Allergies (Unverified , 3/28/15)





Objective


Vital Signs





Last 24 Hour Vital Signs








  Date Time  Temp Pulse Resp B/P (MAP) Pulse Ox O2 Delivery O2 Flow Rate FiO2


 


2/20/18 09:10  89  138/106    


 


2/20/18 08:00 96.1 99 20 138/106 99 Room Air  


 


2/20/18 08:00  89      


 


2/20/18 04:00  88      


 


2/20/18 04:00 96.9 88 18 133/91 95 Room Air  


 


2/20/18 00:00 96.6 91 19 123/74 97 Room Air  


 


2/20/18 00:00  91      


 


2/19/18 20:00 97.6 100 19 132/92 94 Room Air  


 


2/19/18 20:00  94      


 


2/19/18 17:27  84  108/71    


 


2/19/18 16:00  86      


 


2/19/18 16:00 98.2 84 18 108/71 99 Room Air  


 


2/19/18 13:10  96  147/104    


 


2/19/18 12:00 96.4 93 18 147/104 99 Room Air  


 


2/19/18 12:00  96      








Height (Feet):  5


Height (Inches):  2.00


Weight (Pounds):  258





Microbiology








 Date/Time


Source Procedure


Growth Status


 


 


 2/19/18 02:35


Urine,Clean Catch Urine Culture - Preliminary


Gram Negative Bacillus 1 Resulted











Laboratory Tests








Test


  2/19/18


12:20 2/20/18


07:40


 


Total Bilirubin


  0.2 MG/DL


(0.2-1.0) 0.3 MG/DL


(0.2-1.0)


 


Direct Bilirubin


  < 0.1 MG/DL


(0.0-0.3) 


 


 


Aspartate Amino Transf


(AST/SGOT) 73 U/L (15-37)


H 68 U/L (15-37)


H


 


Alanine Aminotransferase


(ALT/SGPT) 117 U/L


(12-78)  H 106 U/L


(12-78)  H


 


Alkaline Phosphatase


  104 U/L


() 96 U/L


()


 


Troponin I


  0.008 ng/mL


(0.000-0.056) 


 


 


Pro-B-Type Natriuretic Peptide


  1331 pg/mL


(0-125)  H 


 


 


Total Protein


  6.1 G/DL


(6.4-8.2)  L 6.0 G/DL


(6.4-8.2)  L


 


Albumin


  1.8 G/DL


(3.4-5.0)  L 1.8 G/DL


(3.4-5.0)  L


 


Thyroid Stimulating Hormone


(TSH) 0.890 uiU/mL


(0.358-3.740) 


 


 


White Blood Count


  


  5.0 K/UL


(4.8-10.8)


 


Red Blood Count


  


  4.12 M/UL


(4.20-5.40)  L


 


Hemoglobin


  


  12.1 G/DL


(12.0-16.0)


 


Hematocrit


  


  37.5 %


(37.0-47.0)


 


Mean Corpuscular Volume  91 FL (80-99)  


 


Mean Corpuscular Hemoglobin


  


  29.5 PG


(27.0-31.0)


 


Mean Corpuscular Hemoglobin


Concent 


  32.4 G/DL


(32.0-36.0)


 


Red Cell Distribution Width


  


  16.6 %


(11.6-14.8)  H


 


Platelet Count


  


  227 K/UL


(150-450)


 


Mean Platelet Volume


  


  7.2 FL


(6.5-10.1)


 


Neutrophils (%) (Auto)


  


  64.5 %


(45.0-75.0)


 


Lymphocytes (%) (Auto)


  


  27.9 %


(20.0-45.0)


 


Monocytes (%) (Auto)


  


  6.5 %


(1.0-10.0)


 


Eosinophils (%) (Auto)


  


  0.5 %


(0.0-3.0)


 


Basophils (%) (Auto)


  


  0.6 %


(0.0-2.0)


 


Sodium Level


  


  140 MMOL/L


(136-145)


 


Potassium Level


  


  4.0 MMOL/L


(3.5-5.1)


 


Chloride Level


  


  109 MMOL/L


()  H


 


Carbon Dioxide Level


  


  28 MMOL/L


(21-32)


 


Anion Gap


  


  3 mmol/L


(5-15)  L


 


Blood Urea Nitrogen


  


  16 mg/dL


(7-18)


 


Creatinine


  


  1.0 MG/DL


(0.55-1.30)


 


Estimat Glomerular Filtration


Rate 


  > 60 mL/min


(>60)


 


Glucose Level


  


  120 MG/DL


()  H


 


Hemoglobin A1c  Pending  


 


Calcium Level


  


  7.8 MG/DL


(8.5-10.1)  L


 


Magnesium Level


  


  1.9 MG/DL


(1.8-2.4)


 


Globulin  4.2 g/dL  


 


Albumin/Globulin Ratio


  


  0.4 (1.0-2.7)


L


 


Triglycerides Level


  


  138 MG/DL


()


 


Cholesterol Level


  


  124 MG/DL (<


200)


 


LDL Cholesterol


  


  72 mg/dL


(<100)


 


HDL Cholesterol


  


  36 MG/DL


(40-60)  L


 


Cholesterol/HDL Ratio  3.4 (3.3-4.4)  











Current Medications








 Medications


  (Trade)  Dose


 Ordered  Sig/Sylvie


 Route


 PRN Reason  Start Time


 Stop Time Status Last Admin


Dose Admin


 


 Acetaminophen


  (Tylenol)  650 mg  Q4H  PRN


 ORAL


 Mild Pain (Pain Scale 1-3)  2/19/18 05:30


 3/21/18 05:29  2/19/18 11:55


 


 


 Acetaminophen


  (Tylenol)  650 mg  Q4H  PRN


 ORAL


 fever  2/19/18 05:30


 3/21/18 05:29   


 


 


 Acetaminophen


  (Tylenol)  650 mg  Q4H  PRN


 RECTAL


 Mild Pain (Pain Scale 1-3)  2/19/18 05:30


 3/21/18 05:29   


 


 


 Acetaminophen


  (Tylenol)  650 mg  Q4H  PRN


 RECTAL


 fever  2/19/18 05:30


 3/21/18 05:29   


 


 


 Azithromycin 500


 mg/Dextrose  275 ml @ 


 275 mls/hr  Q24HRS


 IV


   2/19/18 13:00


 2/25/18 13:59  2/19/18 16:19


 


 


 Bisacodyl


  (Dulcolax)  10 mg  DAILYPRN  PRN


 RECTAL


 Constipation  2/19/18 05:30


 3/21/18 05:29   


 


 


 Ceftriaxone


 Sodium 1 gm/


 Sodium Chloride  55 ml @ 


 110 mls/hr  Q24H


 IVPB


   2/19/18 06:00


 2/26/18 05:59  2/20/18 07:42


 


 


 Dextrose


  (Dextrose 50%)    STAT  PRN


 IV


 Hypoglycemia  2/19/18 05:30


 3/21/18 05:29   


 


 


 Diphenhydramine


 HCl


  (Benadryl)  25 mg  Q6H  PRN


 ORAL


 Itching/Pruritis  2/19/18 05:30


 3/21/18 05:29   


 


 


 Ferrous Sulfate


  (Feosol)  325 mg  DAILY


 ORAL


   2/19/18 12:15


 3/21/18 12:14  2/20/18 09:10


 


 


 Labetalol HCl


  (Normodyne)  100 mg  Q12H  PRN


 ORAL


 sbp/dbp greater than 150/90  2/19/18 19:30


 3/21/18 19:29   


 


 


 Labetalol HCl


  (Normodyne)  200 mg  BID


 ORAL


   2/19/18 12:15


 3/21/18 12:14  2/20/18 09:10


 


 


 Montelukast Sodium


  (Singulair)  10 mg  DAILY


 ORAL


   2/19/18 12:15


 3/21/18 12:14  2/20/18 09:09


 


 


 Polyethylene


 Glycol


  (Miralax)  17 gm  DAILYPRN  PRN


 ORAL


 Constipation  2/19/18 05:30


 3/21/18 05:29   


 


 


 Zolpidem Tartrate


  (Ambien)  5 mg  DAILYPRN  PRN


 ORAL


 Insomnia  2/19/18 05:30


 2/26/18 05:29   


 

















CAN KRAMER M.D. Feb 20, 2018 09:54

## 2018-02-20 NOTE — CARDIOLOGY REPORT
--------------- APPROVED REPORT --------------





EKG Measurement

Heart [id number]

CT 162P29

[id number]

[id number]

[id number]





Normal sinus rhythm

T wave abnormality, consider inferior ischemia

T wave abnormality, consider anterior ischemia

Prolonged QT

Abnormal ECG

## 2018-02-20 NOTE — CARDIOLOGY REPORT
--------------- APPROVED REPORT --------------





EXAM: Two-dimensional and M-mode echocardiogram with Doppler and color 

Doppler.



INDICATION

Hypertension/HCVD



M-Mode DIMENSIONS 

IVSd1.3 (0.7-1.1cm)Left Atrium (MM)4.7 (1.6-4.0cm)

LVDd6.0 (3.5-5.6cm)Aortic Root2.5 (2.0-3.7cm)

PWd1.1 (0.7-1.1cm)Aortic Cusp Exc.2.2 (1.5-2.0cm)



LVDs4.8 (2.5-4.0cm)

PWs1.7 cm





Mild left ventricular enlargement.

Global left ventricular hypokinesia with left ventricular ejection fraction estimated to 

be 20-25 %. Ischemic cardiomyopathy cannot be excluded.

Mild left ventricular hypertrophy.

Possible large pleural effusion. 

Moderate left atrial enlargement.

Mild right ventricular enlargement. 

Right atrial chamber size is within normal limits. 

Focal aortic valve sclerosis with adequate cusp excursion.

Mildly thickened mitral valve leaflets with normal excursion.

Mild mitral annulus and aortic root calcification.

Normal pulmonic valve structure.

Normal tricuspid valve structure. 

IVC dilated at 2.3 cm with physiologic collapse suggestive of increased RA pressure.



A  color flow and spectral Doppler study was performed and revealed:

No aortic regurgitation.

Mild to moderate mitral regurgitation.

Mitral inflow indicates pseudo-normal pattern, implying moderately elevated left atrial 

pressure (Grade II ).

Moderate tricuspid regurgitation.

Tricuspid  systolic velocities suggests peak right ventricular systolic pressure of  54 

mmHg, consistent with moderate pulmonary hypertension.

Mild pulmonic regurgitation present.

## 2018-02-20 NOTE — CARDIOLOGY REPORT
--------------- APPROVED REPORT --------------





EKG Measurement

Heart [id number]

MS 150P53

[id number]

[id number]

[id number]





Sinus tachycardia

Biatrial enlargement

Nonspecific ST and T wave abnormality

Abnormal ECG

## 2018-02-20 NOTE — CARDIOLOGY PROGRESS NOTE
Assessment/Plan


Assessment/Plan


post partum cardiomyopathy 


htn 


preclampsia 


edema 











need to dc labetalol 


start on hydralazine 


she breast feed cannot used acie or nitrates or coreg as is nursing  but 

diuretic and hydralazine are ok 


will start on hydralazine 25 mg tid for now as is safe with lactation 


na fluid restriction d/w pt 


need to see a cardiologists through her health plan after dc





Subjective


Cardiovascular:  Denies: chest pain, irregular heart rate, lightheadedness, 

palpitations


Respiratory:  Denies: shortness of breath


Gastrointestinal/Abdominal:  Denies: abdominal pain


Genitourinary:  Denies: burning





Objective





Last 24 Hour Vital Signs








  Date Time  Temp Pulse Resp B/P (MAP) Pulse Ox O2 Delivery O2 Flow Rate FiO2


 


2/20/18 12:00  87      


 


2/20/18 12:00 96.3 93 19 133/85 98 Room Air  


 


2/20/18 11:23  94 18  100 Room Air  21


 


2/20/18 11:12  94 18  100 Room Air  21


 


2/20/18 11:12        21


 


2/20/18 09:10  89  138/106    


 


2/20/18 08:00 96.1 99 20 138/106 99 Room Air  


 


2/20/18 08:00  89      


 


2/20/18 04:00  88      


 


2/20/18 04:00 96.9 88 18 133/91 95 Room Air  


 


2/20/18 00:00 96.6 91 19 123/74 97 Room Air  


 


2/20/18 00:00  91      


 


2/19/18 20:00 97.6 100 19 132/92 94 Room Air  


 


2/19/18 20:00  94      


 


2/19/18 17:27  84  108/71    


 


2/19/18 16:00  86      


 


2/19/18 16:00 98.2 84 18 108/71 99 Room Air  








General Appearance:  no apparent distress, alert


Neck:  supple


Cardiovascular:  normal rate, regular rhythm


Respiratory/Chest:  lungs clear, normal breath sounds


Abdomen:  non tender, soft


Extremities:  trace edema











Intake and Output  


 


 2/19/18 2/20/18





 19:00 07:00


 


Intake Total 1450 ml 


 


Balance 1450 ml 


 


  


 


Intake Oral 1450 ml 


 


# Voids 7 











Laboratory Tests








Test


  2/20/18


07:40


 


White Blood Count


  5.0 K/UL


(4.8-10.8)


 


Red Blood Count


  4.12 M/UL


(4.20-5.40)  L


 


Hemoglobin


  12.1 G/DL


(12.0-16.0)


 


Hematocrit


  37.5 %


(37.0-47.0)


 


Mean Corpuscular Volume 91 FL (80-99)  


 


Mean Corpuscular Hemoglobin


  29.5 PG


(27.0-31.0)


 


Mean Corpuscular Hemoglobin


Concent 32.4 G/DL


(32.0-36.0)


 


Red Cell Distribution Width


  16.6 %


(11.6-14.8)  H


 


Platelet Count


  227 K/UL


(150-450)


 


Mean Platelet Volume


  7.2 FL


(6.5-10.1)


 


Neutrophils (%) (Auto)


  64.5 %


(45.0-75.0)


 


Lymphocytes (%) (Auto)


  27.9 %


(20.0-45.0)


 


Monocytes (%) (Auto)


  6.5 %


(1.0-10.0)


 


Eosinophils (%) (Auto)


  0.5 %


(0.0-3.0)


 


Basophils (%) (Auto)


  0.6 %


(0.0-2.0)


 


Sodium Level


  140 MMOL/L


(136-145)


 


Potassium Level


  4.0 MMOL/L


(3.5-5.1)


 


Chloride Level


  109 MMOL/L


()  H


 


Carbon Dioxide Level


  28 MMOL/L


(21-32)


 


Anion Gap


  3 mmol/L


(5-15)  L


 


Blood Urea Nitrogen


  16 mg/dL


(7-18)


 


Creatinine


  1.0 MG/DL


(0.55-1.30)


 


Estimat Glomerular Filtration


Rate > 60 mL/min


(>60)


 


Glucose Level


  120 MG/DL


()  H


 


Hemoglobin A1c


  7.0 %


(4.3-6.0)  H


 


Calcium Level


  7.8 MG/DL


(8.5-10.1)  L


 


Magnesium Level


  1.9 MG/DL


(1.8-2.4)


 


Total Bilirubin


  0.3 MG/DL


(0.2-1.0)


 


Aspartate Amino Transf


(AST/SGOT) 68 U/L (15-37)


H


 


Alanine Aminotransferase


(ALT/SGPT) 106 U/L


(12-78)  H


 


Alkaline Phosphatase


  96 U/L


()


 


Total Protein


  6.0 G/DL


(6.4-8.2)  L


 


Albumin


  1.8 G/DL


(3.4-5.0)  L


 


Globulin 4.2 g/dL  


 


Albumin/Globulin Ratio


  0.4 (1.0-2.7)


L


 


Triglycerides Level


  138 MG/DL


()


 


Cholesterol Level


  124 MG/DL (<


200)


 


LDL Cholesterol


  72 mg/dL


(<100)


 


HDL Cholesterol


  36 MG/DL


(40-60)  L


 


Cholesterol/HDL Ratio 3.4 (3.3-4.4)  











Microbiology








 Date/Time


Source Procedure


Growth Status


 


 


 2/19/18 02:35


Urine,Clean Catch Urine Culture - Preliminary


Gram Negative Bacillus 1 Resulted

















KARUNA NGUYEN Feb 20, 2018 14:09

## 2018-02-21 NOTE — CONSULTATION
DATE OF CONSULTATION:  02/19/2018



CARDIOLOGY CONSULTATION



CONSULTING PHYSICIAN:  Jabari Saleem M.D.



REFERRING PHYSICIAN:  Michelle Ornelas M.D.



REASON FOR REFERRAL:  Postpartum hypertension.



HISTORY OF PRESENT ILLNESS:  This is a 42-year-old female, who has had six

deliveries, the last one approximately 10 days ago at [facility].

She has history of hypertension, pre-existing, over the past couple of

deliveries, but her blood pressure has not been significantly elevated,

although she was taking medications prior to her present pregnancy,

apparently became elevated significantly.  Prior to her delivery,

preeclampsia was diagnosed.  The patient was even told that she had some

congestive heart failure, was treated at [place], was discharged

apparently, and presents because of elevated blood pressure again here to

[facility].  Her original admission here was for irregular

heart rhythm, which was worse when she was laying down on her left side.

She does not have any chest pain.  No shortness of breath.  No

palpitation.  No dizziness or lightheadedness.  On standing, she was told

to take diuretics for possible congestive heart failure postoperatively

and she felt that it was not working because she was not peeing enough.

Anyway, she presented to [facility].  Blood pressure initially

was 150/90, but has had levels in the diastolic of more than 100 currently

and she has been admitted to the hospital.  I was asked to see her to help

with management of her hypertension.  She does not have any PND or

orthopnea.



PAST MEDICAL HISTORY:  Positive for high blood pressure.  She has history

of cardiomyopathy.  She denies any diabetes or high cholesterol.  No heart

attack.  No cancer.  No stroke.  No hepatitis or tuberculosis.  No asthma.

No emphysema.  No ulcers.  No kidney problems, liver problems, thyroid

problems, anemia, HIV, AIDS, or blood clots.



ALLERGIES:  She is not allergic to any medication.



SOCIAL HISTORY:  She does not smoke or drink at this time, although she

previously has.  No drug use.



REVIEW OF SYSTEMS:  GASTROINTESTINAL:  Negative.  GENITOURINARY:  Negative.

PULMONARY:  Some coughing and upper respiratory tract infection symptoms

recently and has hoarseness.  NEUROLOGIC:  Negative, although she

indicates she has not been feeling good, but no paralysis or numbness.



PHYSICAL EXAMINATION:

GENERAL:  Shows her to be obese female, in no respiratory distress,

somewhat hoarse in her voice.

LUNGS:  Clear to auscultation and percussion.

CARDIAC:  S1 is normal.  S2 is normal.  Regular rate and rhythm.  No heaves

or thrills.

ABDOMEN:  Soft and nontender.  Positive bowel sounds.

EXTREMITIES:  There is no clubbing, cyanosis, nor is there any

edema.

NEUROLOGICAL:  She is awake, alert, responsive, and in no apparent

respiratory distress.



LABORATORY AND DIAGNOSTIC DATA:  Blood tests during this hospitalization,

white count of 5.1, hemoglobin 12.2, and platelet count 208,000.  Sodium

is 139, potassium 3.5, chloride 106, bicarbonate 27, BUN of 17, creatinine

1.0, glucose of 132, and calcium is 7.8.  AST of only 86 and ALT of 125.

Bilirubin is 0.2.  Troponin on two occasions are negative.  ProBNP of 1300

with total protein of 6.1 and albumin of 1.8.  TSH is 0.89.  Urinalysis

shows 60 to 80 WBCs, too numerous to count RBC, 2+ leukocyte esterase, 5+

occult blood, and 4+ protein.  Her telemetry shows sinus.  Her EKG shows

sinus rhythm with T-wave inversions in III and aVF as well as biphasic

T-waves in V5 and V6 and in direct comparison with her prior EKGs,

unfortunately I am unable to pull down the old EKGs to review, although

reports indicated sinus rhythm and nonspecific T-wave abnormality back in

2015 on two separate hospitalizations, but direct comparison is not

possible.  An echocardiogram performed back in 2015 had shown an ejection

fraction of 30% to 35%, although according to the patient that apparently

improved or resolved.  At that time, she had moderate mitral regurgitation

as well.



ASSESSMENT AND PLAN:

1. Eclampsia.

2. Hypertension.

3. Proteinuria.

4. Possible urinary tract infection.

5. Upper respiratory tract infection.

6. 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

7. Mildly abnormal liver function tests.



The patient's was seen in cardiac consultation.  The patient's blood

pressure is better controlled with the administration of labetalol that

she has been getting here in the hospital.  She should be taken off any

ibuprofen.  She does have peripheral edema, to which she should respond

with diuretics.  She is breastfeeding and that needs to be kept in mind

with possible usage of medications.  Procardia, hydralazine, or labetalol

seemed to be safe at the time of pregnancy, although she did receive

labetalol 200 mg and her blood pressure did significantly drop.  She may

not need to be on medications for very long unless she does have

cardiomyopathy, so an echocardiogram is prudent to be performed to see if

there is evidence of cardiomyopathy at this time to adjust her medications

accordingly.  In addition, of note, she does have abnormality of liver

function tests, but no evidence of microangiopathic hemolytic anemia.

Platelet counts appear to be normal and bilirubin appears to be normal and

liver function tests are not severely enlarged.









  ______________________________________________

  Jabari Saleem M.D.





DR:  Don[place]on

D:  02/19/2018 19:23

T:  02/19/2018 22:57

JOB#:  [id number]

CC:

## 2024-03-30 NOTE — DISCHARGE SUMMARY
Discharge Summary


Hospital Course


Date of Admission


Feb 19, 2018 at 04:18


Date of Discharge


Feb 20, 2018 at 16:30


Admitting Diagnosis


CHF, HTN


Reason for Hospitalization:  CHF


HPI


43y/o female with pmh of HTN, pre-eclampsia, recent pregnancy abt 10 days ago 

at [facility] who presents with SOB, BLE swelling, palpitations. Pt states she was 

diagnosed w/ preeclampsia prior to her deliver. She said after delivery she had 

worsening of BPs, leg swelling, SOB. She was diagnosed w/ CHF. She was recently 

at outside hospital and was treated with diuretics and discharged. Pt cont to c/

o worsening b/l leg swelling, SOB, palpitations. Denies chest pain, f/c, n/v, d/

c, abd pain, dysuria. She has been on lasix every other day but feels it is not 

working. Denies PND, orthopnea.


Consultations


Cardiology, Infectious disease


Hospital Course


Pt was admitted to [facility]. She was ruled out for ACS with serial trop/EKG. She 

was diuresed w/ lasix IV. TTE showed EF 20-25%. Pt was seen by cardiology and 

medications optimized. Labetolol was d/c'd and hydralazine started. Given she 

is lactating cannot start on ACEI/ARB, coreg, nitrates yet. Once fluid status 

improved, she was switched to lasix PO on d/c. Pt also w/ E. Coli UTI and was 

placed on ceftriaxone. Also w/ concern for acute ronchitis, so was placed on 

azithro. On d/c she was transitioned to course of keflex and azithro to 

complete course. CHF education provided. Pt to follow-up closely with PCP and 

cardiology as an outpatient.





Discharge diagnoses


General: alert, cooperative, no distress, appears stated age


Head: normocephalic, without obvious abnormality, atraumatic


Eyes: conjunctivae/corneas clear. PERRL, EOM's intact


Throat: lips, mucosa, and tongue normal. MMM


Neck: supple, symmetrical, trachea midline, and no JVD


Lungs: clear to auscultation bilaterally


Heart: regular rate and rhythm, S1, S2 normal, no murmur, click, rub or gallop


Abdomen: soft, non-tender, non-distended, bowel sounds normal; no masses or 

organomegaly


Extremities: extremities normal, atraumatic, no cyanosis, 1+ BLE edema


Pulses: 2+ and symmetric


Skin: skin color, texture, turgor normal; no rashes or lesions


Neurologic: grossly normal, no focal deficits





Discharge Medications


New Medications:  


Azithromycin* (Zithromax*) 250 Mg Tablet


250 MG ORAL DAILY for 4 Days, #4 TAB





Cephalexin* (Keflex*) 500 Mg Capsule


500 MG ORAL EVERY 6 HOURS for 7 Days, #28 CAP





Furosemide* (Lasix*) 20 Mg Tablet


20 MG ORAL DAILY for 30 Days, #30 TAB 0 Refills





Hydralazine Hcl* (Hydralazine Hcl*) 25 Mg Tablet


25 MG ORAL Q6HR for 30 Days, #120 TAB 0 Refills





 


Continued Medications:  


Albuterol Sulfate* (Albuterol Sulfate Mdi*) 8.5 Gm Hfa.aer.ad


2 PUFF INH Q4H PRN for Shortness of Breath, #1 EA





Albuterol Sulfate* (Albuterol Sulfate Mdi*) 8.5 Gm Hfa.aer.ad


2 PUFF INH Q6H PRN for Shortness of Breath, #1 INH 0 Refills





Albuterol Sulfate* (Proair Hfa*) 8.5 Gm Hfa.aer.ad








Docusate Sodium (Stool Softener) 50 Mg Capsule


Unknown Dose PO, CAP





Ferrous Sulfate* (Ferrous Sulfate*) 325 Mg Tablet


325 MG PO DAILY





Hydrocodone Bit/Acetaminophen 5-325* (Norco 5-325*) 1 Each Tablet


1 TAB ORAL Q6H PRN for For Pain, #10 TAB





Ibuprofen (Ibuprofen) 200 Mg Tablet


Unknown Dose PO, TAB





Montelukast Sodium* (Singulair*) 10 Mg Tablet


10 MG ORAL DAILY, #20 TAB





Montelukast Sodium* (Singulair*) 10 Mg Tablet


Unknown Dose ORAL DAILY, TAB





Prenatal Vit/Iron Fumarate/Fa (Prenatal Vitamins Tablet) 1 Each Tablet


1 TAB PO DAILY





 


Discontinued Medications:  


Azithromycin* (Zithromax*) 250 Mg Tablet


250 MG ORAL DAILY, #6 TAB





Furosemide* (Lasix*) 20 Mg Tablet


20 MG ORAL 3XW, TAB





Labetalol Hcl* (Normodyne*) 200 Mg Tablet


200 MG PO BID





Prednisone* (Prednisone*) 20 Mg Tablet


40 MG ORAL DAILY for 4 Days, TAB











Discharge


Condition Upon Discharge:  stable


Discharge Disposition


Patient was discharged to Home (01)


Discharge Diagnoses:  


(1) Postpartum cardiomyopathy


(2) Acute systolic (congestive) heart failure


(3) HTN (hypertension)


(4) Preeclampsia


(5) Acute bronchitis











Keven Haji M.D. Feb 21, 2018 16:23 [[id number]]